# Patient Record
Sex: MALE | Race: BLACK OR AFRICAN AMERICAN | NOT HISPANIC OR LATINO | URBAN - METROPOLITAN AREA
[De-identification: names, ages, dates, MRNs, and addresses within clinical notes are randomized per-mention and may not be internally consistent; named-entity substitution may affect disease eponyms.]

---

## 2019-03-08 ENCOUNTER — IMPORTED ENCOUNTER (OUTPATIENT)
Dept: URBAN - METROPOLITAN AREA CLINIC 1 | Facility: CLINIC | Age: 39
End: 2019-03-08

## 2019-03-08 PROBLEM — E10.9: Noted: 2019-03-08

## 2019-03-08 PROBLEM — E10.3293: Noted: 2019-03-08

## 2019-03-08 PROBLEM — E10.3553: Noted: 2019-03-08

## 2019-03-08 PROCEDURE — 92250 FUNDUS PHOTOGRAPHY W/I&R: CPT

## 2019-03-08 PROCEDURE — 92004 COMPRE OPH EXAM NEW PT 1/>: CPT

## 2019-03-22 ENCOUNTER — IMPORTED ENCOUNTER (OUTPATIENT)
Dept: URBAN - METROPOLITAN AREA CLINIC 1 | Facility: CLINIC | Age: 39
End: 2019-03-22

## 2019-03-22 PROBLEM — H52.13: Noted: 2019-03-22

## 2019-03-22 PROCEDURE — S0621 ROUTINE OPHTHALMOLOGICAL EXA: HCPCS

## 2019-04-02 ENCOUNTER — IMPORTED ENCOUNTER (OUTPATIENT)
Dept: URBAN - METROPOLITAN AREA CLINIC 1 | Facility: CLINIC | Age: 39
End: 2019-04-02

## 2019-09-13 ENCOUNTER — IMPORTED ENCOUNTER (OUTPATIENT)
Dept: URBAN - METROPOLITAN AREA CLINIC 1 | Facility: CLINIC | Age: 39
End: 2019-09-13

## 2019-09-13 PROBLEM — H04.123: Noted: 2019-09-13

## 2019-09-13 PROBLEM — E10.3553: Noted: 2019-09-13

## 2019-09-13 PROCEDURE — 99213 OFFICE O/P EST LOW 20 MIN: CPT

## 2020-10-20 NOTE — PATIENT DISCUSSION
RETINA IS ATTACHED OU: S/P PPV OD; S/P LASER RETINOPEXY OU; PVD OS; NO NEW HOLES OR TEARS SEEN ON DILATED EXAM TODAY.  RETINAL DETACHMENT SIGNS AND SYMPTOMS REVIEWED

## 2020-12-22 ENCOUNTER — IMPORTED ENCOUNTER (OUTPATIENT)
Dept: URBAN - METROPOLITAN AREA CLINIC 1 | Facility: CLINIC | Age: 40
End: 2020-12-22

## 2020-12-22 PROBLEM — H52.13: Noted: 2020-12-22

## 2020-12-22 PROBLEM — H52.222: Noted: 2020-12-22

## 2020-12-22 PROBLEM — H52.4: Noted: 2020-12-22

## 2020-12-22 PROCEDURE — S0621 ROUTINE OPHTHALMOLOGICAL EXA: HCPCS

## 2020-12-23 ENCOUNTER — IMPORTED ENCOUNTER (OUTPATIENT)
Dept: URBAN - METROPOLITAN AREA CLINIC 1 | Facility: CLINIC | Age: 40
End: 2020-12-23

## 2020-12-23 PROBLEM — H25.13: Noted: 2020-12-23

## 2020-12-23 PROBLEM — H04.123: Noted: 2020-12-23

## 2020-12-23 PROBLEM — E10.3511: Noted: 2020-12-23

## 2020-12-23 PROBLEM — E10.3592: Noted: 2020-12-23

## 2020-12-23 PROCEDURE — 92014 COMPRE OPH EXAM EST PT 1/>: CPT

## 2020-12-23 PROCEDURE — 92134 CPTRZ OPH DX IMG PST SGM RTA: CPT

## 2022-04-02 ASSESSMENT — VISUAL ACUITY
OS_SC: 20/40
OD_CC: J1
OS_SC: 20/25
OD_SC: 20/25+1
OS_CC: J1
OD_SC: 20/25
OD_PH: CC 20/80
OS_CC: J2
OS_SC: 20/20-1
OS_SC: 20/25
OD_SC: 20/150
OD_SC: 20/25
OD_SC: 20/30
OU_SC: 20/30
OD_CC: J16
OS_SC: 20/20
OD_SC: 20/50
OS_SC: 20/25

## 2022-04-02 ASSESSMENT — TONOMETRY
OD_IOP_MMHG: 18
OS_IOP_MMHG: 21
OD_IOP_MMHG: 21
OD_IOP_MMHG: 18
OS_IOP_MMHG: 19
OD_IOP_MMHG: 23
OS_IOP_MMHG: 21
OD_IOP_MMHG: 18
OS_IOP_MMHG: 18
OS_IOP_MMHG: 17

## 2022-04-19 ENCOUNTER — ESTABLISHED PATIENT (OUTPATIENT)
Dept: URBAN - METROPOLITAN AREA CLINIC 1 | Facility: CLINIC | Age: 42
End: 2022-04-19

## 2022-04-19 DIAGNOSIS — E10.3513: ICD-10-CM

## 2022-04-19 DIAGNOSIS — H25.13: ICD-10-CM

## 2022-04-19 DIAGNOSIS — H04.123: ICD-10-CM

## 2022-04-19 PROCEDURE — 92015 DETERMINE REFRACTIVE STATE: CPT

## 2022-04-19 PROCEDURE — 92134 CPTRZ OPH DX IMG PST SGM RTA: CPT

## 2022-04-19 PROCEDURE — 92014 COMPRE OPH EXAM EST PT 1/>: CPT

## 2022-04-19 ASSESSMENT — VISUAL ACUITY
OS_CC: 20/20
OD_CC: 20/60-2
OS_SC: J1+
OD_SC: J3

## 2022-04-19 ASSESSMENT — TONOMETRY
OS_IOP_MMHG: 18
OD_IOP_MMHG: 18

## 2022-05-04 ENCOUNTER — ESTABLISHED PATIENT (OUTPATIENT)
Dept: URBAN - METROPOLITAN AREA CLINIC 1 | Facility: CLINIC | Age: 42
End: 2022-05-04

## 2022-05-04 DIAGNOSIS — H52.13: ICD-10-CM

## 2022-05-04 PROCEDURE — 92014 COMPRE OPH EXAM EST PT 1/>: CPT

## 2022-05-04 ASSESSMENT — VISUAL ACUITY
OS_CC: J1
OD_CC: 20/40
OD_CC: J2
OS_CC: 20/20-

## 2022-05-04 ASSESSMENT — TONOMETRY
OD_IOP_MMHG: 18
OS_IOP_MMHG: 17

## 2022-05-11 ENCOUNTER — CONTACT LENSES/GLASSES VISIT (OUTPATIENT)
Dept: URBAN - METROPOLITAN AREA CLINIC 1 | Facility: CLINIC | Age: 42
End: 2022-05-11

## 2022-05-11 DIAGNOSIS — Z46.0: ICD-10-CM

## 2022-05-11 PROCEDURE — 92310-F CONTACT LENS FITTING FOLLOW UP

## 2022-05-11 ASSESSMENT — VISUAL ACUITY
OD_CC: 20/40
OS_CC: 20/20-2

## 2023-09-05 ENCOUNTER — COMPREHENSIVE EXAM (OUTPATIENT)
Dept: URBAN - METROPOLITAN AREA CLINIC 1 | Facility: CLINIC | Age: 43
End: 2023-09-05

## 2023-09-05 DIAGNOSIS — Z46.0: ICD-10-CM

## 2023-09-05 DIAGNOSIS — H52.4: ICD-10-CM

## 2023-09-05 DIAGNOSIS — Z01.00: ICD-10-CM

## 2023-09-05 DIAGNOSIS — H52.223: ICD-10-CM

## 2023-09-05 DIAGNOSIS — H52.13: ICD-10-CM

## 2023-09-05 PROCEDURE — 92310-2 LEVEL 2 CONTACT LENS MANAGEMENT

## 2023-09-05 PROCEDURE — 92015 DETERMINE REFRACTIVE STATE: CPT

## 2023-09-05 PROCEDURE — 92014 COMPRE OPH EXAM EST PT 1/>: CPT

## 2023-09-05 ASSESSMENT — TONOMETRY
OS_IOP_MMHG: 17
OD_IOP_MMHG: 17

## 2023-09-05 ASSESSMENT — VISUAL ACUITY
OS_CC: 20/20-1
OD_CC: 20/50

## 2023-10-10 ENCOUNTER — COMPREHENSIVE EXAM (OUTPATIENT)
Dept: URBAN - METROPOLITAN AREA CLINIC 1 | Facility: CLINIC | Age: 43
End: 2023-10-10

## 2023-10-10 DIAGNOSIS — H04.123: ICD-10-CM

## 2023-10-10 DIAGNOSIS — H25.813: ICD-10-CM

## 2023-10-10 DIAGNOSIS — E10.3513: ICD-10-CM

## 2023-10-10 PROCEDURE — 92134 CPTRZ OPH DX IMG PST SGM RTA: CPT

## 2023-10-10 PROCEDURE — 92014 COMPRE OPH EXAM EST PT 1/>: CPT

## 2023-10-10 ASSESSMENT — TONOMETRY
OD_IOP_MMHG: 18
OS_IOP_MMHG: 18

## 2023-10-10 ASSESSMENT — VISUAL ACUITY
OS_CC: 20/20-2
OD_CC: 20/40-1

## 2024-05-22 ENCOUNTER — FOLLOW UP (OUTPATIENT)
Dept: URBAN - METROPOLITAN AREA CLINIC 1 | Facility: CLINIC | Age: 44
End: 2024-05-22

## 2024-05-22 DIAGNOSIS — E10.3513: ICD-10-CM

## 2024-05-22 PROCEDURE — 92012 INTRM OPH EXAM EST PATIENT: CPT

## 2024-05-22 ASSESSMENT — VISUAL ACUITY
OD_CC: J1
OS_CC: J1
OS_CC: 20/20-1
OD_CC: 20/40

## 2024-05-22 ASSESSMENT — TONOMETRY
OS_IOP_MMHG: 15
OD_IOP_MMHG: 15

## 2025-05-05 ENCOUNTER — COMPREHENSIVE EXAM (OUTPATIENT)
Age: 45
End: 2025-05-05

## 2025-05-05 DIAGNOSIS — E10.3553: ICD-10-CM

## 2025-05-05 DIAGNOSIS — E10.3513: ICD-10-CM

## 2025-05-05 DIAGNOSIS — H52.13: ICD-10-CM

## 2025-05-05 DIAGNOSIS — H25.813: ICD-10-CM

## 2025-05-05 DIAGNOSIS — H04.123: ICD-10-CM

## 2025-05-05 PROCEDURE — 92014 COMPRE OPH EXAM EST PT 1/>: CPT

## 2025-05-05 PROCEDURE — 92134 CPTRZ OPH DX IMG PST SGM RTA: CPT

## 2025-05-05 PROCEDURE — 92015 DETERMINE REFRACTIVE STATE: CPT
